# Patient Record
Sex: FEMALE | Race: WHITE | NOT HISPANIC OR LATINO | URBAN - METROPOLITAN AREA
[De-identification: names, ages, dates, MRNs, and addresses within clinical notes are randomized per-mention and may not be internally consistent; named-entity substitution may affect disease eponyms.]

---

## 2017-08-17 ENCOUNTER — IMPORTED ENCOUNTER (OUTPATIENT)
Dept: URBAN - METROPOLITAN AREA CLINIC 50 | Facility: CLINIC | Age: 72
End: 2017-08-17

## 2017-08-25 ENCOUNTER — IMPORTED ENCOUNTER (OUTPATIENT)
Dept: URBAN - METROPOLITAN AREA CLINIC 50 | Facility: CLINIC | Age: 72
End: 2017-08-25

## 2018-02-16 ENCOUNTER — IMPORTED ENCOUNTER (OUTPATIENT)
Dept: URBAN - METROPOLITAN AREA CLINIC 50 | Facility: CLINIC | Age: 73
End: 2018-02-16

## 2018-10-05 ENCOUNTER — IMPORTED ENCOUNTER (OUTPATIENT)
Dept: URBAN - METROPOLITAN AREA CLINIC 50 | Facility: CLINIC | Age: 73
End: 2018-10-05

## 2019-04-05 ENCOUNTER — IMPORTED ENCOUNTER (OUTPATIENT)
Dept: URBAN - METROPOLITAN AREA CLINIC 50 | Facility: CLINIC | Age: 74
End: 2019-04-05

## 2019-10-25 ENCOUNTER — IMPORTED ENCOUNTER (OUTPATIENT)
Dept: URBAN - METROPOLITAN AREA CLINIC 50 | Facility: CLINIC | Age: 74
End: 2019-10-25

## 2019-10-25 NOTE — PATIENT DISCUSSION
"""Continue Artificial tears both eyes two - four times a day ."" ""Continue Erythromycin Ointment both eyes at bedtime . "" ""Continue Baby shampoo both eyes in the morning ."" ""Continue Warm compresses both eyes in the morning ."""

## 2021-04-18 ASSESSMENT — VISUAL ACUITY
OS_CC: 20/25
OS_CC: 20/20-1
OS_OTHER: 20/40. 20/60.
OD_OTHER: 20/40. 20/60.
OD_CC: J1+
OS_CC: J1
OS_CC: J1+
OD_BAT: 20/40
OD_CC: J1+
OS_BAT: 20/40
OS_CC: J1+
OD_CC: 20/25-1
OS_CC: 20/25
OD_CC: J1
OS_CC: 20/20
OD_CC: 20/25
OD_BAT: 20/50
OD_CC: 20/25
OS_CC: 20/25
OS_BAT: 20/30
OS_OTHER: 20/30. 20/50.
OS_CC: J1+
OD_OTHER: 20/50. 20/70.
OD_CC: 20/25
OD_CC: 20/25
OD_CC: J1+

## 2021-04-18 ASSESSMENT — TONOMETRY
OD_IOP_MMHG: 18
OS_IOP_MMHG: 19
OS_IOP_MMHG: 20
OD_IOP_MMHG: 17
OS_IOP_MMHG: 17
OD_IOP_MMHG: 19
OS_IOP_MMHG: 21
OS_IOP_MMHG: 18
OD_IOP_MMHG: 20
OD_IOP_MMHG: 20
OS_IOP_MMHG: 17
OD_IOP_MMHG: 17
OD_IOP_MMHG: 21
OD_IOP_MMHG: 18
OS_IOP_MMHG: 20
OD_IOP_MMHG: 18
OS_IOP_MMHG: 18
OD_IOP_MMHG: 19
OS_IOP_MMHG: 19
OS_IOP_MMHG: 18

## 2021-04-18 ASSESSMENT — PACHYMETRY
OS_CT_UM: 573
OD_CT_UM: 567
OS_CT_UM: 573
OD_CT_UM: 567
OS_CT_UM: 573
OD_CT_UM: 567
OS_CT_UM: 573
OD_CT_UM: 567